# Patient Record
Sex: FEMALE | Race: WHITE | URBAN - METROPOLITAN AREA
[De-identification: names, ages, dates, MRNs, and addresses within clinical notes are randomized per-mention and may not be internally consistent; named-entity substitution may affect disease eponyms.]

---

## 2021-11-08 PROBLEM — G47.9 SLEEP DISORDER: Status: ACTIVE | Noted: 2021-11-08

## 2021-11-08 PROBLEM — G47.00 INSOMNIA: Status: ACTIVE | Noted: 2021-11-08

## 2021-11-08 PROBLEM — G93.32 CHRONIC FATIGUE SYNDROME: Status: ACTIVE | Noted: 2021-11-08

## 2024-05-07 ENCOUNTER — HOSPITAL ENCOUNTER (OUTPATIENT)
Age: 29
Discharge: HOME | End: 2024-05-07
Attending: EMERGENCY MEDICINE
Payer: COMMERCIAL

## 2024-05-07 VITALS
HEIGHT: 62 IN | SYSTOLIC BLOOD PRESSURE: 100 MMHG | RESPIRATION RATE: 16 BRPM | HEART RATE: 65 BPM | WEIGHT: 130 LBS | TEMPERATURE: 98 F | BODY MASS INDEX: 23.92 KG/M2 | OXYGEN SATURATION: 99 % | DIASTOLIC BLOOD PRESSURE: 74 MMHG

## 2024-05-07 DIAGNOSIS — L23.7 POISON IVY DERMATITIS: Primary | ICD-10-CM

## 2024-05-07 PROCEDURE — 99204 OFFICE O/P NEW MOD 45 MIN: CPT

## 2024-05-07 PROCEDURE — 99203 OFFICE O/P NEW LOW 30 MIN: CPT

## 2024-05-07 RX ORDER — PREDNISONE 10 MG/1
TABLET ORAL
Qty: 64 TABLET | Refills: 0 | Status: SHIPPED | OUTPATIENT
Start: 2024-05-07

## 2024-05-07 NOTE — ED PROVIDER NOTES
Patient Seen in: [facility]      History     Chief Complaint   Patient presents with    Eye Problem    Rash Skin Problem     Stated Complaint: Eye Pain    Subjective:   HPI    29 yo female was in contact with poison ivy plant on Sunday. Now has itchy rash to face, nec and lower extremities. Right facial swelling and redness with some watering of the eyes but no vision change. Has had poison ivy multiple times in the past.     Objective:   Past Medical History:    Depression              History reviewed. No pertinent surgical history.             Social History     Socioeconomic History    Marital status:    Tobacco Use    Smoking status: Never    Smokeless tobacco: Never   Vaping Use    Vaping status: Never Used   Substance and Sexual Activity    Alcohol use: Yes    Drug use: Never    Sexual activity: Yes     Social Determinants of Health      Received from [facility]              Review of Systems    Positive for stated complaint: Eye Pain  Other systems are as noted in HPI.  Constitutional and vital signs reviewed.      All other systems reviewed and negative except as noted above.    Physical Exam     ED Triage Vitals [05/07/24 0935]   /74   Pulse 65   Resp 16   Temp 97.7 °F (36.5 °C)   Temp src Temporal   SpO2 99 %   O2 Device None (Room air)       Current Vitals:   Vital Signs  BP: 100/74  Pulse: 65  Resp: 16  Temp: 97.7 °F (36.5 °C)  Temp src: Temporal    Oxygen Therapy  SpO2: 99 %  O2 Device: None (Room air)        Right Eye Chart Acuity: 20/50,    Left Eye Chart Acuity: 20/20,      Physical Exam  Vitals and nursing note reviewed.   Constitutional:       Appearance: Normal appearance. She is well-developed.   HENT:      Head: Normocephalic and atraumatic.   Cardiovascular:      Rate and Rhythm: Normal rate and regular rhythm.   Pulmonary:      Effort: Pulmonary effort is normal. No respiratory distress.   Skin:     General: Skin is warm and dry.      Capillary  Refill: Capillary refill takes less than 2 seconds.      Comments: Right periorbital swelling and erythema with scattered maculopapular rash to face and neck.    Neurological:      General: No focal deficit present.      Mental Status: She is alert.      Sensory: No sensory deficit.   Psychiatric:         Mood and Affect: Mood normal.         Behavior: Behavior normal.              ED Course   Labs Reviewed - No data to display                   MDM                                        Medical Decision Making  Poison ivy dermatitis, cellulitis, other contact derm in differential. Exam and history consistent with poison ivy. Discharge on prednisone taper.     Disposition and Plan     Clinical Impression:  1. Poison ivy dermatitis         Disposition:  Discharge  5/7/2024  9:47 am    Follow-up:  Jane Last MD  [address] [place] [id number]  [phone]      As needed          Medications Prescribed:  Current Discharge Medication List        START taking these medications    Details   predniSONE 10 MG Oral Tab Take 6 tablets daily for four days, then 4 tablets daily for four days, then 3 tablets daily for four days, then 2 tablets daily for four days, then 1 tablet daily for four days. Then stop.  Qty: 64 tablet, Refills: 0

## 2024-05-07 NOTE — ED INITIAL ASSESSMENT (HPI)
C/o come in contact with poison ivy on Sunday. Itchy rashes to faces, neck, legs and feet.  Right upper and lower eyelid red, swollen and itchy. Tearing. Some blurring.